# Patient Record
Sex: MALE | Race: WHITE | NOT HISPANIC OR LATINO
[De-identification: names, ages, dates, MRNs, and addresses within clinical notes are randomized per-mention and may not be internally consistent; named-entity substitution may affect disease eponyms.]

---

## 2018-02-01 ENCOUNTER — RECORDS - HEALTHEAST (OUTPATIENT)
Dept: ADMINISTRATIVE | Facility: OTHER | Age: 58
End: 2018-02-01

## 2018-08-08 ENCOUNTER — RECORDS - HEALTHEAST (OUTPATIENT)
Dept: ADMINISTRATIVE | Facility: OTHER | Age: 58
End: 2018-08-08

## 2018-08-08 ENCOUNTER — RECORDS - HEALTHEAST (OUTPATIENT)
Dept: LAB | Facility: CLINIC | Age: 58
End: 2018-08-08

## 2018-08-08 LAB
ALBUMIN SERPL-MCNC: 4 G/DL (ref 3.5–5)
ALP SERPL-CCNC: 102 U/L (ref 45–120)
ALT SERPL W P-5'-P-CCNC: <9 U/L (ref 0–45)
ANION GAP SERPL CALCULATED.3IONS-SCNC: 12 MMOL/L (ref 5–18)
AST SERPL W P-5'-P-CCNC: 15 U/L (ref 0–40)
BILIRUB SERPL-MCNC: 0.7 MG/DL (ref 0–1)
BUN SERPL-MCNC: 13 MG/DL (ref 8–22)
C REACTIVE PROTEIN LHE: 1.6 MG/DL (ref 0–0.8)
CALCIUM SERPL-MCNC: 9.8 MG/DL (ref 8.5–10.5)
CHLORIDE BLD-SCNC: 105 MMOL/L (ref 98–107)
CHOLEST SERPL-MCNC: 191 MG/DL
CO2 SERPL-SCNC: 19 MMOL/L (ref 22–31)
CREAT SERPL-MCNC: 0.87 MG/DL (ref 0.7–1.3)
ERYTHROCYTE [SEDIMENTATION RATE] IN BLOOD BY WESTERGREN METHOD: 6 MM/HR (ref 0–15)
FASTING STATUS PATIENT QL REPORTED: ABNORMAL
GFR SERPL CREATININE-BSD FRML MDRD: >60 ML/MIN/1.73M2
GLUCOSE BLD-MCNC: 92 MG/DL (ref 70–125)
HDLC SERPL-MCNC: 34 MG/DL
LDLC SERPL CALC-MCNC: 133 MG/DL
POTASSIUM BLD-SCNC: 5.3 MMOL/L (ref 3.5–5)
PROT SERPL-MCNC: 6.8 G/DL (ref 6–8)
RHEUMATOID FACT SERPL-ACNC: 55.5 IU/ML (ref 0–30)
SODIUM SERPL-SCNC: 136 MMOL/L (ref 136–145)
TRIGL SERPL-MCNC: 119 MG/DL

## 2018-08-09 LAB
ANA SER QL: 0.8 U
B BURGDOR IGG+IGM SER QL: 0.02 INDEX VALUE
URATE SERPL-MCNC: 5.8 MG/DL (ref 3–8)

## 2018-08-15 ENCOUNTER — RECORDS - HEALTHEAST (OUTPATIENT)
Dept: ADMINISTRATIVE | Facility: OTHER | Age: 58
End: 2018-08-15

## 2018-08-15 ENCOUNTER — COMMUNICATION - HEALTHEAST (OUTPATIENT)
Dept: ADMINISTRATIVE | Facility: CLINIC | Age: 58
End: 2018-08-15

## 2018-10-01 ENCOUNTER — OFFICE VISIT - HEALTHEAST (OUTPATIENT)
Dept: RHEUMATOLOGY | Facility: CLINIC | Age: 58
End: 2018-10-01

## 2018-10-01 DIAGNOSIS — R76.8 RHEUMATOID FACTOR POSITIVE: ICD-10-CM

## 2018-10-01 DIAGNOSIS — M12.30 PALINDROMIC RHEUMATISM: ICD-10-CM

## 2018-10-01 DIAGNOSIS — M25.50 POLYARTHRALGIA: ICD-10-CM

## 2018-10-01 LAB — URATE SERPL-MCNC: 5.3 MG/DL (ref 3–8)

## 2018-10-01 RX ORDER — LISINOPRIL 40 MG/1
40 TABLET ORAL DAILY
Status: SHIPPED | COMMUNITY
Start: 2018-10-01

## 2018-10-01 RX ORDER — AMLODIPINE BESYLATE 5 MG/1
5 TABLET ORAL DAILY
Status: SHIPPED | COMMUNITY
Start: 2018-10-01

## 2018-10-01 ASSESSMENT — MIFFLIN-ST. JEOR: SCORE: 2060.82

## 2018-10-02 LAB
CCP AB SER IA-ACNC: 810.7 U/ML
HBV SURFACE AG SERPL QL IA: NEGATIVE
HCV AB SERPL QL IA: NEGATIVE

## 2018-10-03 LAB — ANCA IGG TITR SER IF: NORMAL {TITER}

## 2019-07-12 ENCOUNTER — RECORDS - HEALTHEAST (OUTPATIENT)
Dept: LAB | Facility: CLINIC | Age: 59
End: 2019-07-12

## 2019-07-12 LAB
ANION GAP SERPL CALCULATED.3IONS-SCNC: 8 MMOL/L (ref 5–18)
BUN SERPL-MCNC: 10 MG/DL (ref 8–22)
CALCIUM SERPL-MCNC: 9.9 MG/DL (ref 8.5–10.5)
CHLORIDE BLD-SCNC: 105 MMOL/L (ref 98–107)
CO2 SERPL-SCNC: 24 MMOL/L (ref 22–31)
CREAT SERPL-MCNC: 0.84 MG/DL (ref 0.7–1.3)
GFR SERPL CREATININE-BSD FRML MDRD: >60 ML/MIN/1.73M2
GLUCOSE BLD-MCNC: 106 MG/DL (ref 70–125)
POTASSIUM BLD-SCNC: 4.6 MMOL/L (ref 3.5–5)
SODIUM SERPL-SCNC: 137 MMOL/L (ref 136–145)

## 2019-07-16 LAB
LAB AP CHARGES (HE HISTORICAL CONVERSION): ABNORMAL
PATH REPORT.COMMENTS IMP SPEC: ABNORMAL
PATH REPORT.COMMENTS IMP SPEC: ABNORMAL
PATH REPORT.FINAL DX SPEC: ABNORMAL
PATH REPORT.GROSS SPEC: ABNORMAL
PATH REPORT.MICROSCOPIC SPEC OTHER STN: ABNORMAL
PATH REPORT.MICROSCOPIC SPEC OTHER STN: ABNORMAL
PATH REPORT.RELEVANT HX SPEC: ABNORMAL
RESULT FLAG (HE HISTORICAL CONVERSION): ABNORMAL

## 2020-02-10 ENCOUNTER — RECORDS - HEALTHEAST (OUTPATIENT)
Dept: LAB | Facility: CLINIC | Age: 60
End: 2020-02-10

## 2020-02-10 LAB
ANION GAP SERPL CALCULATED.3IONS-SCNC: 10 MMOL/L (ref 5–18)
BUN SERPL-MCNC: 13 MG/DL (ref 8–22)
C REACTIVE PROTEIN LHE: 1.6 MG/DL (ref 0–0.8)
CALCIUM SERPL-MCNC: 10.4 MG/DL (ref 8.5–10.5)
CHLORIDE BLD-SCNC: 103 MMOL/L (ref 98–107)
CHOLEST SERPL-MCNC: 192 MG/DL
CO2 SERPL-SCNC: 25 MMOL/L (ref 22–31)
CREAT SERPL-MCNC: 0.86 MG/DL (ref 0.7–1.3)
ERYTHROCYTE [SEDIMENTATION RATE] IN BLOOD BY WESTERGREN METHOD: 3 MM/HR (ref 0–15)
FASTING STATUS PATIENT QL REPORTED: NORMAL
GFR SERPL CREATININE-BSD FRML MDRD: >60 ML/MIN/1.73M2
GLUCOSE BLD-MCNC: 89 MG/DL (ref 70–125)
HDLC SERPL-MCNC: 45 MG/DL
LDLC SERPL CALC-MCNC: 120 MG/DL
POTASSIUM BLD-SCNC: 4.9 MMOL/L (ref 3.5–5)
SODIUM SERPL-SCNC: 138 MMOL/L (ref 136–145)
TRIGL SERPL-MCNC: 137 MG/DL

## 2020-10-23 ENCOUNTER — RECORDS - HEALTHEAST (OUTPATIENT)
Dept: LAB | Facility: CLINIC | Age: 60
End: 2020-10-23

## 2020-10-23 LAB
ANION GAP SERPL CALCULATED.3IONS-SCNC: 12 MMOL/L (ref 5–18)
BUN SERPL-MCNC: 14 MG/DL (ref 8–22)
CALCIUM SERPL-MCNC: 9.6 MG/DL (ref 8.5–10.5)
CHLORIDE BLD-SCNC: 103 MMOL/L (ref 98–107)
CO2 SERPL-SCNC: 21 MMOL/L (ref 22–31)
CREAT SERPL-MCNC: 1.05 MG/DL (ref 0.7–1.3)
GFR SERPL CREATININE-BSD FRML MDRD: >60 ML/MIN/1.73M2
GLUCOSE BLD-MCNC: 141 MG/DL (ref 70–125)
POTASSIUM BLD-SCNC: 4.6 MMOL/L (ref 3.5–5)
SODIUM SERPL-SCNC: 136 MMOL/L (ref 136–145)

## 2021-05-25 ENCOUNTER — RECORDS - HEALTHEAST (OUTPATIENT)
Dept: ADMINISTRATIVE | Facility: CLINIC | Age: 61
End: 2021-05-25

## 2021-06-02 VITALS — HEIGHT: 70 IN | WEIGHT: 273 LBS | BODY MASS INDEX: 39.08 KG/M2

## 2021-06-16 PROBLEM — M12.30 PALINDROMIC RHEUMATISM: Status: ACTIVE | Noted: 2018-10-01

## 2021-06-16 PROBLEM — R76.8 RHEUMATOID FACTOR POSITIVE: Status: ACTIVE | Noted: 2018-10-01

## 2021-06-16 PROBLEM — M25.50 POLYARTHRALGIA: Status: ACTIVE | Noted: 2018-10-01

## 2021-06-20 NOTE — PROGRESS NOTES
ASSESSMENT AND PLAN:  Carter Inman 57 y.o. male is seen here on 10/01/18 for evaluation of polyarthralgias, positive rheumatoid factor.  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  Ideally one would like to see signs of inflammation on examination.  There is however reasonably strong evidence to make the diagnosis in the absence of abnormal objective findings today.   We had discussed the option of hydroxychloroquine.  I have given him literature on hydroxychloroquine and other DMARD's.  Should he decide to change his mind HC Q can be started on the  phone.   He would prefer to wait.  He would like to come back when he has more pain or swelling. Otherwise we will meet here in 3 months, he will come in sooner should he have swelling or pain prior to that.  Other workup as noted today.  Diagnoses and all orders for this visit:    Polyarthralgia  -     CCP Antibodies  -     Hepatitis C Antibody (Anti-HCV)  -     Anti-Neutrophil Cytoplasmic Antibody, IgG (ANCA IFA)  -     Hepatitis B Surface Antigen (HBsAG)  -     Uric Acid    Palindromic rheumatism  -     CCP Antibodies  -     Hepatitis C Antibody (Anti-HCV)  -     Anti-Neutrophil Cytoplasmic Antibody, IgG (ANCA IFA)  -     Hepatitis B Surface Antigen (HBsAG)  -     Uric Acid    Rheumatoid factor positive  -     CCP Antibodies  -     Hepatitis C Antibody (Anti-HCV)  -     Anti-Neutrophil Cytoplasmic Antibody, IgG (ANCA IFA)  -     Hepatitis B Surface Antigen (HBsAG)  -     Uric Acid      HISTORY OF PRESENTING ILLNESS:  Carter Inman, 57 y.o., male is here for evaluation of painful joints.  He reports that this is gone on for the past nearly 12 months or even a little longer.  He reports pattern which is random, which consists of painful areas of joint involvement, tenderness and swelling intermittently.  This can happen in quick succession across several days or it may not happen for several weeks.  Today's a good  day for him.  Indeed he noted that over the past 6 weeks he has not had a flareup.  He has taken pictures that he brings on his cell phone such as of his left hand where he noted swelling around the area of the third MCP.  This is clearly seen on that picture.  He has noted that on 1 or 2 occasions the back of the hand i.e. the dorsum of the hand has been swollen and on another occasion the dorsum of the left foot has been swollen.  Other times he would have pain in his shoulders.  At other times he would have pain in his forearms.  One point he remembers that there was no swelling in and the pain was so severe that moving his hand fingers caused him severe pain and this originated in the middle of his dorsum of the hand.  He has been worked up with the primary physician negative NIK negative Lyme serology positive rheumatoid factor.  He neither he nor his family has a history of psoriasis.  There is no family history of rheumatoid arthritis, lupus, ankylosing spondylitis.  As noted before today and he has virtually no pain anywhere.  When he does have the pain typically mornings are the worst.  He has had long-standing increased diaphoresis and elevated by white cell count that he can recall as far back as 20 years.  He does not smoke, alcohol up to 4 drinks per week.  He has a history of hypertension.    Further historical information and ADL limitations as noted in the multidimensional health assessment questionnaire attached in the EMR. Rest of the 13 system ROS is negative.     ALLERGIES:Review of patient's allergies indicates no known allergies.    PAST MEDICAL/ACTIVE PROBLEMS/MEDICATION/ FAMILY HISTORY/SOCIAL DATA:  The patient has a family history of  No past medical history on file.  History   Smoking Status     Current Every Day Smoker   Smokeless Tobacco     Current User     There is no problem list on file for this patient.    Current Outpatient Prescriptions   Medication Sig Dispense Refill      "amLODIPine (NORVASC) 5 MG tablet Take 5 mg by mouth daily.       lisinopril (PRINIVIL,ZESTRIL) 40 MG tablet Take 40 mg by mouth daily.       No current facility-administered medications for this visit.        COMPREHENSIVE EXAMINATION:  Vitals:    10/01/18 1150   BP: 152/86   Patient Site: Right Arm   Patient Position: Sitting   Cuff Size: Adult Large   Pulse: 72   Weight: (!) 273 lb (123.8 kg)   Height: 5' 10.08\" (1.78 m)     A well appearing alert oriented male. Vital data as noted above. His eyes without inflammation/scleromalacia. ENTwithout oral mucositis, thrush, nasal deformity, external ear redness, deformity. His neck is without lymphadenopathy and supple. Lungs normal sounds, no pleural rub. Heart auscultation normal rate, rhythm; no pericardial rub and murmurs. Abdomen soft, non tender, no organomegaly. Skin examined for heliotrope, malar area eruption, lupus pernio, periungual erythema, sclerodactyly, papules, erythema nodosum, purpura, nail pitting, onycholysis, and obvious psoriasis lesion. Neurological examination shows normal alertness, speech, facial symmetry, tone and power in upper and lower extremities, Tinel's and Phalen's at wrist and gait. The joint examination is performed for swelling, tenderness, warmth, erythema, and range of motion in the following joints: DIPs, PIPs, MCPs, wrists, first CMC's, elbows, shoulders, hips, knees, ankles, feet; spine for range of motion and paraspinal muscles for tenderness. The salient normal / abnormal findings are appended.  He does not have synovitis in any of the palpable joints of upper or lower extremities.  He does not have dactylitis, does not have enthesitis such as a Colles', patellar quadriceps lateral epicondylar areas.    LAB / IMAGING DATA:  ALT   Date Value Ref Range Status   08/08/2018 <9 0 - 45 U/L Final     Albumin   Date Value Ref Range Status   08/08/2018 4.0 3.5 - 5.0 g/dL Final     Creatinine   Date Value Ref Range Status   08/08/2018 " 0.87 0.70 - 1.30 mg/dL Final   05/01/2017 0.91 0.70 - 1.30 mg/dL Final   03/22/2016 0.93 0.70 - 1.30 mg/dL Final       No results found for: WBC, HGB, PLT    Lab Results   Component Value Date    RF 55.5 (H) 08/08/2018    SEDRATE 6 08/08/2018

## 2022-01-01 ENCOUNTER — LAB REQUISITION (OUTPATIENT)
Dept: LAB | Facility: CLINIC | Age: 62
End: 2022-01-01

## 2022-01-01 DIAGNOSIS — I10 ESSENTIAL (PRIMARY) HYPERTENSION: ICD-10-CM

## 2022-01-01 DIAGNOSIS — E78.1 PURE HYPERGLYCERIDEMIA: ICD-10-CM

## 2022-01-01 LAB
ANION GAP SERPL CALCULATED.3IONS-SCNC: 10 MMOL/L (ref 5–18)
BUN SERPL-MCNC: 12 MG/DL (ref 8–22)
CALCIUM SERPL-MCNC: 9.9 MG/DL (ref 8.5–10.5)
CHLORIDE BLD-SCNC: 103 MMOL/L (ref 98–107)
CHOLEST SERPL-MCNC: 219 MG/DL
CO2 SERPL-SCNC: 25 MMOL/L (ref 22–31)
CREAT SERPL-MCNC: 0.9 MG/DL (ref 0.7–1.3)
FASTING STATUS PATIENT QL REPORTED: ABNORMAL
GFR SERPL CREATININE-BSD FRML MDRD: >90 ML/MIN/1.73M2
GLUCOSE BLD-MCNC: 86 MG/DL (ref 70–125)
HDLC SERPL-MCNC: 42 MG/DL
LDLC SERPL CALC-MCNC: 135 MG/DL
POTASSIUM BLD-SCNC: 4.9 MMOL/L (ref 3.5–5)
SODIUM SERPL-SCNC: 138 MMOL/L (ref 136–145)
TRIGL SERPL-MCNC: 210 MG/DL

## 2022-01-01 PROCEDURE — 80061 LIPID PANEL: CPT | Performed by: PHYSICIAN ASSISTANT

## 2022-01-01 PROCEDURE — 80048 BASIC METABOLIC PNL TOTAL CA: CPT | Performed by: PHYSICIAN ASSISTANT
